# Patient Record
(demographics unavailable — no encounter records)

---

## 2025-01-08 NOTE — ASSESSMENT
[FreeTextEntry1] : 41yo w/ PMHx tobacco use disorder, alcohol use disorder, recurrent alcohol induced pancreatitis (most recent admission 12/2024 to [facility] requiring ICU admission for hypertriglyceredemia), hx incidentally noted pancreatic tail hypodensity, HTN presenting for evaluation of pos FIT and recurrent pancreatitis.   #Recurrent alcoholic pancreatitis #Pancreatic tail lesion 2cm  Several prior admissions for pancreatitis including most recent admission to [place] 12/2024 requiring ICU stay. Etiology suspected to be most likely secondary to alcohol use disorder though most recent admission also with triglycerides >2000, possible in setting heavy alcohol use. He was incidentally noted to have 2cm pancreatic tail lesion on imaging for which etiology remains unclear. Differential includes acute peripancreatic collection vs. mucinous cystic neoplasm (less likely) vs. less likely less likely NET or sollid tumor. IPMN unlikely given location (not in head) and acute necrotic collection unlikely. Requires further imaging to help differentiate etiologies and guide further management.  Recommendations: -MRI/MRCP 6-8 weeks after most recent imaging to allow for evaluation of walled off collection if present- would aim for week of 2/17 or later  -If pseudocyst and <12cm and asymptomatic would likely survey with imaging  #Alcohol use disorder #Hepatic steatosis with hepatomegaly and elevated LFT's is consistent with alcoholic steatohepatitis #Hypoalbuminemia, thrombocytopenia suggestive of cirrhosis with portal hypertension History of heavy alcohol use with mildly elevated liver enzymes during recent admission, likely related to alcohol use. His MDF was low at 7 at that time suggesting against diagnosis of alcoholic hepatitis. Concerning is his low albumin and plts which though may be due to alcohol use also raise concern for developing cirrhosis. Reassuringly most recent imaging with hepatic steatosis only. Would carefully monitory. Discussed today the importance of abstinence from alcohol with the patient. He may be interested in meds in the future and is interested in finding an RPP that is virtual, if possible, which he will follow up with PCP regarding.  Recommendations: -Trend liver enzymes -MRI as above -Would consider liver elastography at next visit -Abstinence from EtOH as above  #Positive FIT test Positive FIT.  He appears to be at average risk of colon cancer based on family history and typically would not be due for CRC screening until 45 years old but given his positive FIT, can pursue colonoscopy for further evaluation of occult bleeding.  Recommendations:  -Schedule for colonoscopy  -Golytetly prep sent to pharmacy  -CLD day prior to procedure -Risks/benefits of procedure discussed including risk of bleeding/perforation/infection  Mary Salter GI/hep fellow  Discussed with Dr. Lambert

## 2025-01-08 NOTE — END OF VISIT
[] : Fellow [FreeTextEntry3] : As modified and discussed with patient MD YONY Posada [facility] Associate Professor of Medicine [facility]

## 2025-01-08 NOTE — END OF VISIT
[] : Fellow [FreeTextEntry3] : As modified and discussed with patient MD YONY Posada [facility] Associate Professor of Medicine [facility]

## 2025-01-08 NOTE — ASSESSMENT
[FreeTextEntry1] : 43yo w/ PMHx tobacco use disorder, alcohol use disorder, recurrent alcohol induced pancreatitis (most recent admission 12/2024 to [facility] requiring ICU admission for hypertriglyceredemia), hx incidentally noted pancreatic tail hypodensity, HTN presenting for evaluation of pos FIT and recurrent pancreatitis.   #Recurrent alcoholic pancreatitis #Pancreatic tail lesion 2cm  Several prior admissions for pancreatitis including most recent admission to [place] 12/2024 requiring ICU stay. Etiology suspected to be most likely secondary to alcohol use disorder though most recent admission also with triglycerides >2000, possible in setting heavy alcohol use. He was incidentally noted to have 2cm pancreatic tail lesion on imaging for which etiology remains unclear. Differential includes acute peripancreatic collection vs. mucinous cystic neoplasm (less likely) vs. less likely less likely NET or sollid tumor. IPMN unlikely given location (not in head) and acute necrotic collection unlikely. Requires further imaging to help differentiate etiologies and guide further management.  Recommendations: -MRI/MRCP 6-8 weeks after most recent imaging to allow for evaluation of walled off collection if present- would aim for week of 2/17 or later  -If pseudocyst and <12cm and asymptomatic would likely survey with imaging  #Alcohol use disorder #Hepatic steatosis with hepatomegaly and elevated LFT's is consistent with alcoholic steatohepatitis #Hypoalbuminemia, thrombocytopenia suggestive of cirrhosis with portal hypertension History of heavy alcohol use with mildly elevated liver enzymes during recent admission, likely related to alcohol use. His MDF was low at 7 at that time suggesting against diagnosis of alcoholic hepatitis. Concerning is his low albumin and plts which though may be due to alcohol use also raise concern for developing cirrhosis. Reassuringly most recent imaging with hepatic steatosis only. Would carefully monitory. Discussed today the importance of abstinence from alcohol with the patient. He may be interested in meds in the future and is interested in finding an RPP that is virtual, if possible, which he will follow up with PCP regarding.  Recommendations: -Trend liver enzymes -MRI as above -Would consider liver elastography at next visit -Abstinence from EtOH as above  #Positive FIT test Positive FIT.  He appears to be at average risk of colon cancer based on family history and typically would not be due for CRC screening until 45 years old but given his positive FIT, can pursue colonoscopy for further evaluation of occult bleeding.  Recommendations:  -Schedule for colonoscopy  -Golytetly prep sent to pharmacy  -CLD day prior to procedure -Risks/benefits of procedure discussed including risk of bleeding/perforation/infection  Mary Salter GI/hep fellow  Discussed with Dr. Lambert

## 2025-01-09 NOTE — HISTORY OF PRESENT ILLNESS
[FreeTextEntry2] : Patient is a 43 yo male with tobacco use disorder, alcohol use disorder, recurrent alcoholic-induced pancreatitis, HTN here for hospital follow up.  From Discharge note provider: "42 male non-compliant current smoker with PMH ETOH pancreatitis, alcohol use disorder, HTN, HLD who presented to ED at [facility] 12/28 for left flank pain radiating to LLQ of abdomen beginning last night. Denied nausea, vomiting, diarrhea, hematochezia, melena. Admitting to drinking 5-6 drinks of jarek for the past week, last drink night prior to admission, subsequently symptoms began. prior to onset of symptoms.  CTAP revealed acute pancreatitis as well as revealing 2cm pancreatic tail hypodensity possible necrotic lesion. Surgery consulted in ED recommended usual treatment for acute pancreatitis. Pt given IV NS, dilaudid, made NPO. Admitted to medicine, subsequent lipid panel resulted revealing triglycerides 2700. Brought to ICU for insulin infusion  Patient's symptoms improved, triglycerides < 1000. Insulin infusion stopped. Now ambulatory, tolerating PO and eager for discharge. For discharge today on fenofibrate, niacin and statin therapy, to follow up in [facility] next well. Patient also advised to stop drinking  Discharge Medications  Lipitor 20 mg oral tablet: 1 tab(s) orally once a day (at bedtime) losartan 100 mg oral tablet: 1 tab(s) orally once a day Niavasc 500 mg oral tablet, extended release: 1 tab(s) orally once a day (at bedtime) Nicoderm C-Q 7 mg/24 hr transdermal film, extended release: 1 patch transdermal once a day transdermal Norvasc 10 mg oral tablet: 1 tab(s) orally once a day TriCor 145 mg oral tablet: 1 tab(s) orally once a day"  Patient was seen by GI for +FIT and pancreatitis. Plan for colonscopy on 1/13. Their recommendation regarding pancreatic lesion: -MRI/MRCP 6-8 weeks after most recent imaging to allow for evaluation of walled off collection if present- would aim for week of 2/17 or later -If pseudocyst and <12cm and asymptomatic would likely survey with imaging. Patient will continue to follow along with GI.

## 2025-01-09 NOTE — HISTORY OF PRESENT ILLNESS
[FreeTextEntry2] : Patient is a 43 yo male with tobacco use disorder, alcohol use disorder, recurrent alcoholic-induced pancreatitis, HTN here for hospital follow up.  From Discharge note provider: "42 male non-compliant current smoker with PMH ETOH pancreatitis, alcohol use disorder, HTN, HLD who presented to ED at [facility] 12/28 for left flank pain radiating to LLQ of abdomen beginning last night. Denied nausea, vomiting, diarrhea, hematochezia, melena. Admitting to drinking 5-6 drinks of jarek for the past week, last drink night prior to admission, subsequently symptoms began. prior to onset of symptoms.  CTAP revealed acute pancreatitis as well as revealing 2cm pancreatic tail hypodensity possible necrotic lesion. Surgery consulted in ED recommended usual treatment for acute pancreatitis. Pt given IV NS, dilaudid, made NPO. Admitted to medicine, subsequent lipid panel resulted revealing triglycerides 2700. Brought to ICU for insulin infusion  Patient's symptoms improved, triglycerides < 1000. Insulin infusion stopped. Now ambulatory, tolerating PO and eager for discharge. For discharge today on fenofibrate, niacin and statin therapy, to follow up in [facility] next well. Patient also advised to stop drinking  Discharge Medications  Lipitor 20 mg oral tablet: 1 tab(s) orally once a day (at bedtime) losartan 100 mg oral tablet: 1 tab(s) orally once a day Niavasc 500 mg oral tablet, extended release: 1 tab(s) orally once a day (at bedtime) Nicoderm C-Q 7 mg/24 hr transdermal film, extended release: 1 patch transdermal once a day transdermal Norvasc 10 mg oral tablet: 1 tab(s) orally once a day TriCor 145 mg oral tablet: 1 tab(s) orally once a day"  Patient was seen by GI for +FIT and pancreatitis. Plan for colonscopy on 1/13. Their recommendation regarding pancreatic lesion: -MRI/MRCP 6-8 weeks after most recent imaging to allow for evaluation of walled off collection if present- would aim for week of 2/17 or later -If pseudocyst and <12cm and asymptomatic would likely survey with imaging. Patient will continue to follow along with GI.

## 2025-02-12 NOTE — PHYSICAL EXAM
[Normal] : normal rate, regular rhythm, normal S1 and S2 and no murmur heard [No Edema] : there was no peripheral edema [Soft] : abdomen soft [Non Tender] : non-tender [Non-distended] : non-distended [No Masses] : no abdominal mass palpated [de-identified] : +scleral icterus

## 2025-02-12 NOTE — COUNSELING
[Yes] : Risk of tobacco use and health benefits of smoking cessation discussed: Yes [Use of nicotine replacement therapies and other medications discussed] : Use of nicotine replacement therapies and other medications discussed [No] : Not willing to quit smoking [Hazards of at-risk alcohol use discussed] : Hazards of at-risk alcohol use discussed [Strategies to reduce or eliminate alcohol use discussed] : Strategies to reduce or eliminate alcohol use discussed [Support options provided] : Support options provided [FreeTextEntry2] : Patient stated that he has cut down on his drinking ! however he is here post hospital due to ETOH issues.

## 2025-02-12 NOTE — HISTORY OF PRESENT ILLNESS
[FreeTextEntry2] : 42M with PMhx tobacco use disorder, alcohol use disorder, recurrent alcoholic-induced pancreatitis, HTN here for hospital follow up. Admitted 12/28-1/1 From discharge note provider "42 male non-compliant current smoker with PMH ETOH pancreatitis, alcohol use disorder, HTN, HLD who presented to ED at [facility] 12/28 for left flank pain radiating to LLQ of abdomen beginning last night. Denied nausea, vomiting, diarrhea, hematochezia, melena. Admitting to drinking 5-6 drinks of jarek for the past week, last drink night prior to admission, subsequently symptoms began. prior to onset of symptoms.  CTAP revealed acute pancreatitis as well as revealing 2cm pancreatic tail hypodensity possible necrotic lesion. Surgery consulted in ED recommended usual treatment for acute pancreatitis. Pt given IV NS, dilaudid, made NPO. Admitted to medicine, subsequent lipid panel resulted revealing triglycerides 2700. Brought to ICU for insulin infusion  Today patient's symptoms improved, triglycerides < 1000. Insulin infusion stopped. Now ambulatory, tolerating PO and eager for discharge. For discharge today on fenofibrate, niacin and statin therapy, to follow up in [facility] next well. Patient also advised to stop drinking  Med Reconciliation: Medication Reconciliation Status Admission Reconciliation is Completed Discharge Reconciliation is Completed Discharge Medications Lipitor 20 mg oral tablet: 1 tab(s) orally once a day (at bedtime) losartan 100 mg oral tablet: 1 tab(s) orally once a day Niavasc 500 mg oral tablet, extended release: 1 tab(s) orally once a day (at bedtime) Nicoderm C-Q 7 mg/24 hr transdermal film, extended release: 1 patch transdermal once a day transdermal Norvasc 10 mg oral tablet: 1 tab(s) orally once a day TriCor 145 mg oral tablet: 1 tab(s) orally once a day"  Patient saw outpatient GI and then did not show for his planned MRCP  States that he is feeling much better.

## 2025-02-12 NOTE — PHYSICAL EXAM
[Normal] : normal rate, regular rhythm, normal S1 and S2 and no murmur heard [No Edema] : there was no peripheral edema [Soft] : abdomen soft [Non Tender] : non-tender [Non-distended] : non-distended [No Masses] : no abdominal mass palpated [de-identified] : +scleral icterus

## 2025-02-12 NOTE — HISTORY OF PRESENT ILLNESS
[FreeTextEntry2] : 42M with PMhx tobacco use disorder, alcohol use disorder, recurrent alcoholic-induced pancreatitis, HTN here for hospital follow up. Admitted 12/28-1/1 From discharge note provider "42 male non-compliant current smoker with PMH ETOH pancreatitis, alcohol use disorder, HTN, HLD who presented to ED at [facility] 12/28 for left flank pain radiating to LLQ of abdomen beginning last night. Denied nausea, vomiting, diarrhea, hematochezia, melena. Admitting to drinking 5-6 drinks of jarek for the past week, last drink night prior to admission, subsequently symptoms began. prior to onset of symptoms.  CTAP revealed acute pancreatitis as well as revealing 2cm pancreatic tail hypodensity possible necrotic lesion. Surgery consulted in ED recommended usual treatment for acute pancreatitis. Pt given IV NS, dilaudid, made NPO. Admitted to medicine, subsequent lipid panel resulted revealing triglycerides 2700. Brought to ICU for insulin infusion  Today patient's symptoms improved, triglycerides < 1000. Insulin infusion stopped. Now ambulatory, tolerating PO and eager for discharge. For discharge today on fenofibrate, niacin and statin therapy, to follow up in [facility] next well. Patient also advised to stop drinking  Med Reconciliation: Medication Reconciliation Status Admission Reconciliation is Completed Discharge Reconciliation is Completed Discharge Medications Lipitor 20 mg oral tablet: 1 tab(s) orally once a day (at bedtime) losartan 100 mg oral tablet: 1 tab(s) orally once a day Niavasc 500 mg oral tablet, extended release: 1 tab(s) orally once a day (at bedtime) Nicoderm C-Q 7 mg/24 hr transdermal film, extended release: 1 patch transdermal once a day transdermal Norvasc 10 mg oral tablet: 1 tab(s) orally once a day TriCor 145 mg oral tablet: 1 tab(s) orally once a day"  Patient saw outpatient GI and then did not show for his planned MRCP  States that he is feeling much better.

## 2025-02-12 NOTE — HISTORY OF PRESENT ILLNESS
[FreeTextEntry2] : 42M with PMhx tobacco use disorder, alcohol use disorder, recurrent alcoholic-induced pancreatitis, HTN here for hospital follow up. Admitted 12/28-1/1 From discharge note provider "42 male non-compliant current smoker with PMH ETOH pancreatitis, alcohol use disorder, HTN, HLD who presented to ED at [facility] 12/28 for left flank pain radiating to LLQ of abdomen beginning last night. Denied nausea, vomiting, diarrhea, hematochezia, melena. Admitting to drinking 5-6 drinks of jarek for the past week, last drink night prior to admission, subsequently symptoms began. prior to onset of symptoms.  CTAP revealed acute pancreatitis as well as revealing 2cm pancreatic tail hypodensity possible necrotic lesion. Surgery consulted in ED recommended usual treatment for acute pancreatitis. Pt given IV NS, dilaudid, made NPO. Admitted to medicine, subsequent lipid panel resulted revealing triglycerides 2700. Brought to ICU for insulin infusion  Today patient's symptoms improved, triglycerides < 1000. Insulin infusion stopped. Now ambulatory, tolerating PO and eager for discharge. For discharge today on fenofibrate, niacin and statin therapy, to follow up in [facility] next well. Patient also advised to stop drinking  Med Reconciliation: Medication Reconciliation Status Admission Reconciliation is Completed Discharge Reconciliation is Completed Discharge Medications Lipitor 20 mg oral tablet: 1 tab(s) orally once a day (at bedtime) losartan 100 mg oral tablet: 1 tab(s) orally once a day Niavasc 500 mg oral tablet, extended release: 1 tab(s) orally once a day (at bedtime) Nicoderm C-Q 7 mg/24 hr transdermal film, extended release: 1 patch transdermal once a day transdermal Norvasc 10 mg oral tablet: 1 tab(s) orally once a day TriCor 145 mg oral tablet: 1 tab(s) orally once a day"  Patient saw outpatient GI and then did not show for his planned MRCP  States that he is feeling much better.

## 2025-02-12 NOTE — PHYSICAL EXAM
[Normal] : normal rate, regular rhythm, normal S1 and S2 and no murmur heard [No Edema] : there was no peripheral edema [Soft] : abdomen soft [Non Tender] : non-tender [Non-distended] : non-distended [No Masses] : no abdominal mass palpated [de-identified] : +scleral icterus

## 2025-02-12 NOTE — END OF VISIT
[] : Resident [FreeTextEntry3] : I had a detailed talk about his condition , ETOH and smoking, specially that he started smoking marijuana. [Time Spent: ___ minutes] : I have spent [unfilled] minutes of time on the encounter which excludes teaching and separately reported services.